# Patient Record
Sex: FEMALE | Race: WHITE
[De-identification: names, ages, dates, MRNs, and addresses within clinical notes are randomized per-mention and may not be internally consistent; named-entity substitution may affect disease eponyms.]

---

## 2019-12-25 ENCOUNTER — HOSPITAL ENCOUNTER (EMERGENCY)
Dept: HOSPITAL 26 - MED | Age: 49
LOS: 1 days | Discharge: HOME | End: 2019-12-26
Payer: COMMERCIAL

## 2019-12-25 VITALS — HEIGHT: 66 IN | WEIGHT: 212 LBS | BODY MASS INDEX: 34.07 KG/M2

## 2019-12-25 VITALS — DIASTOLIC BLOOD PRESSURE: 82 MMHG | SYSTOLIC BLOOD PRESSURE: 137 MMHG

## 2019-12-25 DIAGNOSIS — Z76.0: ICD-10-CM

## 2019-12-25 DIAGNOSIS — F20.9: Primary | ICD-10-CM

## 2019-12-26 VITALS — DIASTOLIC BLOOD PRESSURE: 82 MMHG | SYSTOLIC BLOOD PRESSURE: 137 MMHG

## 2019-12-26 NOTE — NUR
PT DISCHARGED WITH PAPERWORK. EDUCATED PT REGARDING MEDICATIONS. PT VERBALIZED 
UNDERSTANDING. PT AT STABLE CONDITION. ALL QUESTIONS ANSWERED.

## 2019-12-26 NOTE — NUR
49 Y/O FEMALE BIBA. PT PRESENTS TO ED FOR MED REFILL. PT HAS HX OF SCHIZO. PT 
STATES RUNNING OUT OF MEDICATIONS. PT DENIES DTS/DTO. PT DENIES ANY 
AUDITORY/VISUAL HALLUCINATIONS. PT AT STABLE CONDITION. ERMD AWARE. WILL 
CONTINUE TO MONITOR.

## 2021-05-16 ENCOUNTER — HOSPITAL ENCOUNTER (EMERGENCY)
Dept: HOSPITAL 26 - MED | Age: 51
Discharge: LEFT BEFORE DISCHARGE | End: 2021-05-16
Payer: COMMERCIAL

## 2021-05-16 VITALS — DIASTOLIC BLOOD PRESSURE: 71 MMHG | SYSTOLIC BLOOD PRESSURE: 132 MMHG

## 2021-05-16 VITALS — BODY MASS INDEX: 28.61 KG/M2 | WEIGHT: 178 LBS | HEIGHT: 66 IN

## 2021-05-16 DIAGNOSIS — Z53.21: ICD-10-CM

## 2021-05-16 DIAGNOSIS — F20.9: Primary | ICD-10-CM

## 2021-08-26 ENCOUNTER — HOSPITAL ENCOUNTER (EMERGENCY)
Dept: HOSPITAL 26 - MED | Age: 51
LOS: 1 days | Discharge: HOME | End: 2021-08-27
Payer: COMMERCIAL

## 2021-08-26 VITALS — SYSTOLIC BLOOD PRESSURE: 132 MMHG | DIASTOLIC BLOOD PRESSURE: 79 MMHG

## 2021-08-26 VITALS — WEIGHT: 250 LBS | BODY MASS INDEX: 40.18 KG/M2 | HEIGHT: 66 IN

## 2021-08-26 DIAGNOSIS — F19.10: Primary | ICD-10-CM

## 2021-08-26 LAB
ANION GAP SERPL CALCULATED.3IONS-SCNC: 12.2 MMOL/L (ref 8–16)
BASOPHILS # BLD AUTO: 0 K/UL (ref 0–0.22)
BASOPHILS NFR BLD AUTO: 0.2 % (ref 0–2)
BUN SERPL-MCNC: 8 MG/DL (ref 7–18)
CHLORIDE SERPL-SCNC: 103 MMOL/L (ref 98–107)
CO2 SERPL-SCNC: 27 MMOL/L (ref 21–32)
CREAT SERPL-MCNC: 1 MG/DL (ref 0.6–1.3)
EOSINOPHIL # BLD AUTO: 0 K/UL (ref 0–0.4)
EOSINOPHIL NFR BLD AUTO: 0.2 % (ref 0–4)
ERYTHROCYTE [DISTWIDTH] IN BLOOD BY AUTOMATED COUNT: 14.4 % (ref 11.6–13.7)
GFR SERPL CREATININE-BSD FRML MDRD: 76 ML/MIN (ref 90–?)
GLUCOSE SERPL-MCNC: 136 MG/DL (ref 74–106)
HCT VFR BLD AUTO: 38.4 % (ref 36–48)
HGB BLD-MCNC: 12.9 G/DL (ref 12–16)
LYMPHOCYTES # BLD AUTO: 3.2 K/UL (ref 2.5–16.5)
LYMPHOCYTES NFR BLD AUTO: 28.7 % (ref 20.5–51.1)
MCH RBC QN AUTO: 30 PG (ref 27–31)
MCHC RBC AUTO-ENTMCNC: 34 G/DL (ref 33–37)
MCV RBC AUTO: 88.9 FL (ref 80–94)
MONOCYTES # BLD AUTO: 0.9 K/UL (ref 0.8–1)
MONOCYTES NFR BLD AUTO: 8.6 % (ref 1.7–9.3)
NEUTROPHILS # BLD AUTO: 6.9 K/UL (ref 1.8–7.7)
NEUTROPHILS NFR BLD AUTO: 62.3 % (ref 42.2–75.2)
PLATELET # BLD AUTO: 300 K/UL (ref 140–450)
POTASSIUM SERPL-SCNC: 3.2 MMOL/L (ref 3.5–5.1)
RBC # BLD AUTO: 4.33 MIL/UL (ref 4.2–5.4)
SODIUM SERPL-SCNC: 139 MMOL/L (ref 136–145)
WBC # BLD AUTO: 11.1 K/UL (ref 4.8–10.8)

## 2021-08-26 PROCEDURE — G0482 DRUG TEST DEF 15-21 CLASSES: HCPCS

## 2021-08-26 PROCEDURE — 99284 EMERGENCY DEPT VISIT MOD MDM: CPT

## 2021-08-26 PROCEDURE — 84702 CHORIONIC GONADOTROPIN TEST: CPT

## 2021-08-26 PROCEDURE — 93005 ELECTROCARDIOGRAM TRACING: CPT

## 2021-08-26 PROCEDURE — 36415 COLL VENOUS BLD VENIPUNCTURE: CPT

## 2021-08-26 PROCEDURE — 85025 COMPLETE CBC W/AUTO DIFF WBC: CPT

## 2021-08-26 PROCEDURE — 80048 BASIC METABOLIC PNL TOTAL CA: CPT

## 2021-08-26 PROCEDURE — 96360 HYDRATION IV INFUSION INIT: CPT

## 2021-08-26 RX ADMIN — SODIUM CHLORIDE ONE MLS/HR: 9 INJECTION, SOLUTION INTRAVENOUS at 23:29

## 2021-08-26 NOTE — NUR
BRANDON SHELL [phone], PT'S LANDLORD CALLED TO GIVE INFO. STATED HE FOUND AN 
EMPTY BOTTLE OF CLOZAPINE 50 MG IN WHICH HE BELIEVES IS WHAT THE PT TOOK. HE 
STATED THE BOTTLE MAY HAVE HAD ABOUT 22 PILLS LEFT SINCE SHE BEGAN TAKING THEM 
ON AUG 8-9TH.

## 2021-08-26 NOTE — NUR
49 YO F BIBA AFTER TAKING SOME SORT OF DRUG OR MEDICATION, MEDIC STATED PT 
CALLED 911 HERSELF. PT WAS FOUND LETHARGIC. MEDIC STATED PT HAS HX OF DRUG 
ABUSE BUT HAS BEEN CLEAN FOR 90 DAYS. PT TOLD MEDIC SHE HAS USED HEROINE, 
SPEED, AND THC IN PAST. ASKED IF PT WAS TRYING TO HURT HERSELF; PT DOES NOT 
ANSWER QUESTIONS DIRECTLY. SPEECH IS SLURRED. UNABLE TO STAND NOR AMBULATE AT 
THIS TIME. 



HX: HYPOTHYROIDISM, OVERACTIVE BLADDER

RX:[email or web address]

## 2021-08-26 NOTE — NUR
CALLED [facility] AND SPOKE TO ESME, PHARMACIST. STATED PT SHOULD STAY FOR 
AT LEAST 6HRS FOR OBSERVATION, RECHECK EKG 4HRS AFTER INITAL AND WATCH FOR 
FOLLOWING- CNS DEPRESSION, TACHYCARDIA, HYPOTENSION, SEIZURES, AND MONITOR K, 
MAG, AND CA. SEIZURE PADS IN PLACE, PT ON CARDIAC MONITOR.

## 2021-08-26 NOTE — NUR
JAYDA JOHNS [phone] CALLED TO GIVE INFO. MOTHER STATED PT HAS A HX OF DRUG 
ABUSE, BIPOLAR, AND DEPRESSION. PT IS BEING SEEN AT [facility] IN 
[place] AND IS UNDER THE CARE OF A PSYCHIATRIST. STATED THAT PT HAS HAD 
A ROUGH WEEK AND TODAY AROUND 8PM PT CALLED MOTHER TO PICK HER UP FROM A 
[employer] MEETING, ALEX NOTICED SHE WAS VERY UPSET BUT DID NOT WANT TO 
TALK ABOUT IT. MOTHER STATED SHE HAS HX OF TRYING TO HURT HERSELF. PT WAS 
HOSPITALIZED AUG 5TH-8TH FOR TRYING TO HURT SELF. MOTHER GAVE LIST OF MEDS. 



RX: INVEGA INJS, CLOZARIL, LEVOTHY, OXYBUTIN 5MG, VISTARIL, CYMBALTA

## 2021-08-26 NOTE — NUR
Pt returned call. [facility] call [phone]. Thank you. unable to get urine at this time, patient ended up urinating on the bed before 
a bed pan was given

## 2021-08-27 VITALS — DIASTOLIC BLOOD PRESSURE: 79 MMHG | SYSTOLIC BLOOD PRESSURE: 132 MMHG

## 2021-08-27 NOTE — NUR
PT USED HER CELL PHONE TO CALL MOTHER. I CALLED AS WELL, NO ANSWER. PT STATED 
SHE CAN WAIT IN LOBBY FOR HER MOM.

## 2021-08-27 NOTE — NUR
PT PULLED OF EKG LINES AND SAT AT END OF BED. PT URINATED IN BED. SHEETS AND 
GOWN CHANGED. PT URINATED ON FLOOR. SHE LAYED BACK IN BED FACE DOWN AND REFUSES 
TO ALLOW EKG LINES TO BE PLACE. BP AND 02 SAT STILL IN PLACE.

## 2021-08-27 NOTE — NUR
PT HAS EPISODES OF YELLING AND CONTINUES TO TRY TO GET OUT OF BED. PT IS 
CONFUSED AND PULLS OF MEDICAL EQUIPMENT. PT IS RESTLESS, TOSSING AND TURNING IN 
BED.

## 2021-08-27 NOTE — NUR
PT HAS EYES CLOSED, EQUAL RISE AND FALL OF CHEST WALL. OPENS EYES TO SOUND. PT 
IN STABLE CONDITION. BED LOCKED IN LOWEST POSITION, SIDE RAILS X2. ALL NEEDS 
MET AT THIS TIME.

## 2021-08-27 NOTE — NUR
PT WAS ABLE TO AMBULATE TO RR AND BACK TO WITH MINIMAL HELP, SHE JUST NEEDED 
HELP SITTING UP IN BED AND BEGAN TO WALK ALONE. PT GOT HER STUFF READY AND WAS 
ABLE TO CHANGE.

## 2021-08-27 NOTE — NUR
PT CAN SIT UP FOR MOMENTS BUT FALLS OVER. CANDI ARREDONDO MADE AWARE, STATED SHES 
OK WITH DISCHARGING PT.